# Patient Record
Sex: MALE | Race: WHITE | NOT HISPANIC OR LATINO | URBAN - METROPOLITAN AREA
[De-identification: names, ages, dates, MRNs, and addresses within clinical notes are randomized per-mention and may not be internally consistent; named-entity substitution may affect disease eponyms.]

---

## 2023-06-26 ENCOUNTER — INPATIENT (INPATIENT)
Facility: HOSPITAL | Age: 68
LOS: 0 days | Discharge: LEFT BEFORE DISCHARGE | DRG: 156 | End: 2023-06-27
Attending: STUDENT IN AN ORGANIZED HEALTH CARE EDUCATION/TRAINING PROGRAM | Admitting: STUDENT IN AN ORGANIZED HEALTH CARE EDUCATION/TRAINING PROGRAM
Payer: COMMERCIAL

## 2023-06-26 VITALS
DIASTOLIC BLOOD PRESSURE: 98 MMHG | HEART RATE: 87 BPM | TEMPERATURE: 98 F | WEIGHT: 196.21 LBS | RESPIRATION RATE: 16 BRPM | OXYGEN SATURATION: 96 % | SYSTOLIC BLOOD PRESSURE: 175 MMHG

## 2023-06-26 LAB
ALBUMIN SERPL ELPH-MCNC: 4 G/DL — SIGNIFICANT CHANGE UP (ref 3.4–5)
ALP SERPL-CCNC: 63 U/L — SIGNIFICANT CHANGE UP (ref 40–120)
ALT FLD-CCNC: 20 U/L — SIGNIFICANT CHANGE UP (ref 12–42)
ANION GAP SERPL CALC-SCNC: 12 MMOL/L — SIGNIFICANT CHANGE UP (ref 9–16)
APTT BLD: 30.3 SEC — SIGNIFICANT CHANGE UP (ref 27.5–35.5)
AST SERPL-CCNC: 25 U/L — SIGNIFICANT CHANGE UP (ref 15–37)
BASOPHILS # BLD AUTO: 0.03 K/UL — SIGNIFICANT CHANGE UP (ref 0–0.2)
BASOPHILS NFR BLD AUTO: 0.4 % — SIGNIFICANT CHANGE UP (ref 0–2)
BILIRUB SERPL-MCNC: 0.3 MG/DL — SIGNIFICANT CHANGE UP (ref 0.2–1.2)
BUN SERPL-MCNC: 22 MG/DL — SIGNIFICANT CHANGE UP (ref 7–23)
CALCIUM SERPL-MCNC: 9.3 MG/DL — SIGNIFICANT CHANGE UP (ref 8.5–10.5)
CHLORIDE SERPL-SCNC: 105 MMOL/L — SIGNIFICANT CHANGE UP (ref 96–108)
CO2 SERPL-SCNC: 22 MMOL/L — SIGNIFICANT CHANGE UP (ref 22–31)
CREAT SERPL-MCNC: 1.03 MG/DL — SIGNIFICANT CHANGE UP (ref 0.5–1.3)
EGFR: 80 ML/MIN/1.73M2 — SIGNIFICANT CHANGE UP
EOSINOPHIL # BLD AUTO: 0.13 K/UL — SIGNIFICANT CHANGE UP (ref 0–0.5)
EOSINOPHIL NFR BLD AUTO: 1.8 % — SIGNIFICANT CHANGE UP (ref 0–6)
GLUCOSE SERPL-MCNC: 115 MG/DL — HIGH (ref 70–99)
HCT VFR BLD CALC: 43.5 % — SIGNIFICANT CHANGE UP (ref 39–50)
HGB BLD-MCNC: 14.8 G/DL — SIGNIFICANT CHANGE UP (ref 13–17)
IMM GRANULOCYTES NFR BLD AUTO: 0.4 % — SIGNIFICANT CHANGE UP (ref 0–0.9)
INR BLD: 0.99 — SIGNIFICANT CHANGE UP (ref 0.88–1.16)
LYMPHOCYTES # BLD AUTO: 1.62 K/UL — SIGNIFICANT CHANGE UP (ref 1–3.3)
LYMPHOCYTES # BLD AUTO: 22.8 % — SIGNIFICANT CHANGE UP (ref 13–44)
MCHC RBC-ENTMCNC: 31 PG — SIGNIFICANT CHANGE UP (ref 27–34)
MCHC RBC-ENTMCNC: 34 GM/DL — SIGNIFICANT CHANGE UP (ref 32–36)
MCV RBC AUTO: 91 FL — SIGNIFICANT CHANGE UP (ref 80–100)
MONOCYTES # BLD AUTO: 0.66 K/UL — SIGNIFICANT CHANGE UP (ref 0–0.9)
MONOCYTES NFR BLD AUTO: 9.3 % — SIGNIFICANT CHANGE UP (ref 2–14)
NEUTROPHILS # BLD AUTO: 4.64 K/UL — SIGNIFICANT CHANGE UP (ref 1.8–7.4)
NEUTROPHILS NFR BLD AUTO: 65.3 % — SIGNIFICANT CHANGE UP (ref 43–77)
NRBC # BLD: 0 /100 WBCS — SIGNIFICANT CHANGE UP (ref 0–0)
PLATELET # BLD AUTO: 189 K/UL — SIGNIFICANT CHANGE UP (ref 150–400)
POTASSIUM SERPL-MCNC: 4.2 MMOL/L — SIGNIFICANT CHANGE UP (ref 3.5–5.3)
POTASSIUM SERPL-SCNC: 4.2 MMOL/L — SIGNIFICANT CHANGE UP (ref 3.5–5.3)
PROT SERPL-MCNC: 7.3 G/DL — SIGNIFICANT CHANGE UP (ref 6.4–8.2)
PROTHROM AB SERPL-ACNC: 11.6 SEC — SIGNIFICANT CHANGE UP (ref 10.5–13.4)
RBC # BLD: 4.78 M/UL — SIGNIFICANT CHANGE UP (ref 4.2–5.8)
RBC # FLD: 13.6 % — SIGNIFICANT CHANGE UP (ref 10.3–14.5)
SODIUM SERPL-SCNC: 139 MMOL/L — SIGNIFICANT CHANGE UP (ref 132–145)
TROPONIN I, HIGH SENSITIVITY RESULT: 5.2 NG/L — SIGNIFICANT CHANGE UP
WBC # BLD: 7.11 K/UL — SIGNIFICANT CHANGE UP (ref 3.8–10.5)
WBC # FLD AUTO: 7.11 K/UL — SIGNIFICANT CHANGE UP (ref 3.8–10.5)

## 2023-06-26 PROCEDURE — 99446 NTRPROF PH1/NTRNET/EHR 5-10: CPT

## 2023-06-26 PROCEDURE — 71045 X-RAY EXAM CHEST 1 VIEW: CPT | Mod: 26

## 2023-06-26 PROCEDURE — 70498 CT ANGIOGRAPHY NECK: CPT | Mod: 26

## 2023-06-26 PROCEDURE — 70496 CT ANGIOGRAPHY HEAD: CPT | Mod: 26

## 2023-06-26 PROCEDURE — 99291 CRITICAL CARE FIRST HOUR: CPT

## 2023-06-26 PROCEDURE — 0042T: CPT

## 2023-06-26 RX ORDER — MECLIZINE HCL 12.5 MG
25 TABLET ORAL ONCE
Refills: 0 | Status: COMPLETED | OUTPATIENT
Start: 2023-06-26 | End: 2023-06-26

## 2023-06-26 RX ADMIN — Medication 25 MILLIGRAM(S): at 19:43

## 2023-06-26 NOTE — ED ADULT NURSE NOTE - NSFALLUNIVINTERV_ED_ALL_ED
Bed/Stretcher in lowest position, wheels locked, appropriate side rails in place/Call bell, personal items and telephone in reach/Instruct patient to call for assistance before getting out of bed/chair/stretcher/Non-slip footwear applied when patient is off stretcher/[place] to call system/Physically safe environment - no spills, clutter or unnecessary equipment/Purposeful proactive rounding/Room/bathroom lighting operational, light cord in reach

## 2023-06-26 NOTE — ED PROVIDER NOTE - OBJECTIVE STATEMENT
Patient reports sudden onset at 4pm of gait imbalance, left tinnitus, and decreased hearing in left ear, like he is under water. No ear pain. 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 No fever, cp, sob, ap, n/v, focal weakness, paresthesias.

## 2023-06-26 NOTE — ED ADULT TRIAGE NOTE - CHIEF COMPLAINT QUOTE
Pt just arrived to [facility] 5 hours ago, c/o left ear echoing x 2 hrs, Pt is deaf in right ear. H/o TIA 9/2022. Denies any weakness, dizziness, unsteady gait,  facial paralysis.  Speech clear. Reports minor head that he took Tylenol for prior to arrival

## 2023-06-26 NOTE — ED PROVIDER NOTE - CLINICAL SUMMARY MEDICAL DECISION MAKING FREE TEXT BOX
Code stroke called due to unsteady gait. My initial impression was that this was a peripheral vertigo or menniere's but patient's description of constant dizziness and history of TIA, code stroke called.

## 2023-06-26 NOTE — ED ADULT NURSE REASSESSMENT NOTE - NS ED NURSE REASSESS COMMENT FT1
Received this pt from SEGUN[facility]arro. Pt comfortable, in nad.  Placed on CM  please see associated stroke code flowsheet

## 2023-06-26 NOTE — ED PROVIDER NOTE - PROGRESS NOTE DETAILS
Patient accepted to [facility] stroke unit by Dr. Solorzano. Spoke to telestroke Dr. Bella. She recommended admission for MRI. Agrees could be vestibular in origin, but given patient's risk factors and vague symptoms, it is unclear at this time.

## 2023-06-26 NOTE — ED ADULT NURSE NOTE - CHIEF COMPLAINT QUOTE
Pt just arrived to [facility] 5 hours ago, c/o left ear echoing x 2 hrs, Pt is deaf in right ear. H/o TIA 9/2022. Denies any weakness, dizziness, unsteady gait,  facial paralysis.  Speech clear. Reports minor head that he took Tylenol for prior to arrival

## 2023-06-26 NOTE — CHART NOTE - NSCHARTNOTEFT_GEN_A_CORE
Brief TeleStroke Note    69 yo man with h/o TIA, on AP, who presents today with hearing loss and tinnitus in L ear associated with dizziness which started at 4pm. NIHSS 0 per ED staff with no dysmetria and patient is able to walk.    CT negative for acute abnormalities, CTA with no LVO, CTP neg.    Although the patient is in the Tenecteplase window, he is not a candidate for Tenecteplase due to minor symptoms and NIHSS 0. Not a candidate for thrombectomy due to lack of LVO.    Hyun Bella  Neurocritical Care Attending Brief TeleStroke Note -Interprofessional Telephone discussion    69 yo man with h/o TIA, on AP, who presents today with hearing loss and tinnitus in L ear associated with dizziness which started at 4pm. NIHSS 0 per ED staff with no dysmetria and patient is able to walk.    CT negative for acute abnormalities, CTA with no LVO, CTP neg.    Although the patient is in the Tenecteplase window, he is not a candidate for Tenecteplase due to minor symptoms and NIHSS 0. Not a candidate for thrombectomy due to lack of LVO.      Review thrombolytic CONTRAINDICATIONS  [] None    ABSOLUTE EXCLUSION CRITERIA:  [] Patient outside of the appropriate time window for IV thrombolysis  [] Evidence of intracranial hemorrhage on pretreatment CT scan (CT must be read by an attending radiologist or attending neurologist)  [] Head CT findings suggesting an established acute cerebral infarction as evidenced by sigifredo hypodensity, regardless of size.  [] Clinical presentation consistent with subarachnoid hemorrhage, even with normal CT scan  [] Active internal bleeding  [] Known bleeding diathesis (including but not limited to platelet count < 100,000, use of oral anticoagulants with INR>1.7, use of full dose low molecular       weight heparin within the last 24 hours, use of unfractionated heparin AND a prolonged PTT (> 40sec), use of direct thrombin inhibitor (e.g. dabigatran) or oral direct Factor Xa inhibitor (e.g. rivaroxaban, apixaban) within 48 hours) with any degree of abnormality on any coagulation test; any DOAC taken within 3 hours of presentation, regardless of coagulation test results  [] Systolic pressure >= 185 mmHg or diastolic pressure 110 mmHg on repeated measurements at the time treatment is to begin  [] Care team unable to determine eligibility for IV thrombolysis  [] Patient, family, or surrogate declines and understands risks and benefits of treatment.  [] For wake-up Protocol patients: DW-MRI lesions larger than one-third of the MCA territory    RELATIVE EXCLUSION CRITERIA  [] Isolated neurological deficits (except for aphasia or hemianopsia)  [x] stroke severity too mild (non-disabling)  [] Seizure at onset with post-ictal residual neurological impairment  [] Gastrointestinal, genitourinary or respiratory hemorrhage within 21 days   [] Major surgery within 14 days   [] Blood glucose level < 50 or > 400 mG/dL  [] CPR with chest compressions or minor surgery (including liver and kidney biopsy, thoracocentesis, lumbar puncture) within 10 days   [] Arterial puncture at non-compressible site within 7 days   [] Evidence of acute trauma (fracture)   Significant head trauma or prior stroke in the previous 3 months  History of previous intracranial hemorrhage  Intracranial or intraspinal surgery within past 3 months[] Diabetic hemorrhagic retinopathy or ophthalmic bleeding  [] Pregnancy or peripartum; no nursing post- treatment  [] Post-myocardial infarction pericarditis  [] Peritoneal dialysis or hemodialysis or severe hepatic disease   [] Known bacterial endocarditis   [] Life expectancy less than 6 months or sever co-morbid illness   [] Known cerebral vascular malformation, untreated intracranial aneurysm or brain tumor (may consider IV alteplase in patients with CNS lesions that have a very low likelihood of hemorrhage, such as small un-ruptured aneurysms or benign tumors with low vascularity.  [] Social/Shinto reason  [] Other ____________________      Time spent during discussion (in minutes): 10 min      Hyun Bella  Neurocritical Care Attending

## 2023-06-27 VITALS — TEMPERATURE: 97 F

## 2023-06-27 LAB
A1C WITH ESTIMATED AVERAGE GLUCOSE RESULT: 5.4 % — SIGNIFICANT CHANGE UP (ref 4–5.6)
ANION GAP SERPL CALC-SCNC: 12 MMOL/L — SIGNIFICANT CHANGE UP (ref 5–17)
BUN SERPL-MCNC: 13 MG/DL — SIGNIFICANT CHANGE UP (ref 7–23)
CALCIUM SERPL-MCNC: 9.4 MG/DL — SIGNIFICANT CHANGE UP (ref 8.4–10.5)
CHLORIDE SERPL-SCNC: 108 MMOL/L — SIGNIFICANT CHANGE UP (ref 96–108)
CHOLEST SERPL-MCNC: 144 MG/DL — SIGNIFICANT CHANGE UP
CO2 SERPL-SCNC: 22 MMOL/L — SIGNIFICANT CHANGE UP (ref 22–31)
CREAT SERPL-MCNC: 0.81 MG/DL — SIGNIFICANT CHANGE UP (ref 0.5–1.3)
EGFR: 97 ML/MIN/1.73M2 — SIGNIFICANT CHANGE UP
ESTIMATED AVERAGE GLUCOSE: 108 MG/DL — SIGNIFICANT CHANGE UP (ref 68–114)
GLUCOSE SERPL-MCNC: 111 MG/DL — HIGH (ref 70–99)
HCT VFR BLD CALC: 47.4 % — SIGNIFICANT CHANGE UP (ref 39–50)
HCV AB S/CO SERPL IA: 0.04 S/CO — SIGNIFICANT CHANGE UP
HCV AB SERPL-IMP: SIGNIFICANT CHANGE UP
HDLC SERPL-MCNC: 44 MG/DL — SIGNIFICANT CHANGE UP
HGB BLD-MCNC: 16 G/DL — SIGNIFICANT CHANGE UP (ref 13–17)
LIPID PNL WITH DIRECT LDL SERPL: 82 MG/DL — SIGNIFICANT CHANGE UP
MAGNESIUM SERPL-MCNC: 2.1 MG/DL — SIGNIFICANT CHANGE UP (ref 1.6–2.6)
MCHC RBC-ENTMCNC: 30.2 PG — SIGNIFICANT CHANGE UP (ref 27–34)
MCHC RBC-ENTMCNC: 33.8 GM/DL — SIGNIFICANT CHANGE UP (ref 32–36)
MCV RBC AUTO: 89.6 FL — SIGNIFICANT CHANGE UP (ref 80–100)
NON HDL CHOLESTEROL: 100 MG/DL — SIGNIFICANT CHANGE UP
NRBC # BLD: 0 /100 WBCS — SIGNIFICANT CHANGE UP (ref 0–0)
PHOSPHATE SERPL-MCNC: 3 MG/DL — SIGNIFICANT CHANGE UP (ref 2.5–4.5)
PLATELET # BLD AUTO: 183 K/UL — SIGNIFICANT CHANGE UP (ref 150–400)
POTASSIUM SERPL-MCNC: 4 MMOL/L — SIGNIFICANT CHANGE UP (ref 3.5–5.3)
POTASSIUM SERPL-SCNC: 4 MMOL/L — SIGNIFICANT CHANGE UP (ref 3.5–5.3)
RBC # BLD: 5.29 M/UL — SIGNIFICANT CHANGE UP (ref 4.2–5.8)
RBC # FLD: 13.9 % — SIGNIFICANT CHANGE UP (ref 10.3–14.5)
SODIUM SERPL-SCNC: 142 MMOL/L — SIGNIFICANT CHANGE UP (ref 135–145)
TRIGL SERPL-MCNC: 89 MG/DL — SIGNIFICANT CHANGE UP
TSH SERPL-MCNC: 1.78 UIU/ML — SIGNIFICANT CHANGE UP (ref 0.27–4.2)
WBC # BLD: 5.57 K/UL — SIGNIFICANT CHANGE UP (ref 3.8–10.5)
WBC # FLD AUTO: 5.57 K/UL — SIGNIFICANT CHANGE UP (ref 3.8–10.5)

## 2023-06-27 PROCEDURE — 97165 OT EVAL LOW COMPLEX 30 MIN: CPT

## 2023-06-27 PROCEDURE — 99223 1ST HOSP IP/OBS HIGH 75: CPT

## 2023-06-27 PROCEDURE — 99291 CRITICAL CARE FIRST HOUR: CPT | Mod: 25

## 2023-06-27 PROCEDURE — 85025 COMPLETE CBC W/AUTO DIFF WBC: CPT

## 2023-06-27 PROCEDURE — 82962 GLUCOSE BLOOD TEST: CPT

## 2023-06-27 PROCEDURE — 70498 CT ANGIOGRAPHY NECK: CPT

## 2023-06-27 PROCEDURE — 84484 ASSAY OF TROPONIN QUANT: CPT

## 2023-06-27 PROCEDURE — 0042T: CPT

## 2023-06-27 PROCEDURE — 85610 PROTHROMBIN TIME: CPT

## 2023-06-27 PROCEDURE — 84100 ASSAY OF PHOSPHORUS: CPT

## 2023-06-27 PROCEDURE — 85730 THROMBOPLASTIN TIME PARTIAL: CPT

## 2023-06-27 PROCEDURE — 93005 ELECTROCARDIOGRAM TRACING: CPT

## 2023-06-27 PROCEDURE — 71045 X-RAY EXAM CHEST 1 VIEW: CPT

## 2023-06-27 PROCEDURE — 99254 IP/OBS CNSLTJ NEW/EST MOD 60: CPT

## 2023-06-27 PROCEDURE — 80061 LIPID PANEL: CPT

## 2023-06-27 PROCEDURE — 85027 COMPLETE CBC AUTOMATED: CPT

## 2023-06-27 PROCEDURE — 97161 PT EVAL LOW COMPLEX 20 MIN: CPT

## 2023-06-27 PROCEDURE — 80053 COMPREHEN METABOLIC PANEL: CPT

## 2023-06-27 PROCEDURE — 36415 COLL VENOUS BLD VENIPUNCTURE: CPT

## 2023-06-27 PROCEDURE — 83036 HEMOGLOBIN GLYCOSYLATED A1C: CPT

## 2023-06-27 PROCEDURE — 70496 CT ANGIOGRAPHY HEAD: CPT

## 2023-06-27 PROCEDURE — 84443 ASSAY THYROID STIM HORMONE: CPT

## 2023-06-27 PROCEDURE — 70450 CT HEAD/BRAIN W/O DYE: CPT

## 2023-06-27 PROCEDURE — 83735 ASSAY OF MAGNESIUM: CPT

## 2023-06-27 PROCEDURE — 80048 BASIC METABOLIC PNL TOTAL CA: CPT

## 2023-06-27 PROCEDURE — 86803 HEPATITIS C AB TEST: CPT

## 2023-06-27 RX ORDER — ENOXAPARIN SODIUM 100 MG/ML
40 INJECTION SUBCUTANEOUS EVERY 24 HOURS
Refills: 0 | Status: DISCONTINUED | OUTPATIENT
Start: 2023-06-27 | End: 2023-06-27

## 2023-06-27 RX ORDER — ASPIRIN/CALCIUM CARB/MAGNESIUM 324 MG
81 TABLET ORAL DAILY
Refills: 0 | Status: DISCONTINUED | OUTPATIENT
Start: 2023-06-27 | End: 2023-06-27

## 2023-06-27 RX ORDER — CLOPIDOGREL BISULFATE 75 MG/1
75 TABLET, FILM COATED ORAL DAILY
Refills: 0 | Status: DISCONTINUED | OUTPATIENT
Start: 2023-06-27 | End: 2023-06-27

## 2023-06-27 RX ORDER — ATORVASTATIN CALCIUM 80 MG/1
40 TABLET, FILM COATED ORAL AT BEDTIME
Refills: 0 | Status: DISCONTINUED | OUTPATIENT
Start: 2023-06-27 | End: 2023-06-27

## 2023-06-27 RX ADMIN — CLOPIDOGREL BISULFATE 75 MILLIGRAM(S): 75 TABLET, FILM COATED ORAL at 11:03

## 2023-06-27 RX ADMIN — ENOXAPARIN SODIUM 40 MILLIGRAM(S): 100 INJECTION SUBCUTANEOUS at 06:33

## 2023-06-27 NOTE — H&P ADULT - ASSESSMENT
68y Male with PMHx of TIA (presented as blurry vision, on Plavix 75mg daily), HTN, HLD, right ear deafness, initially presenting to [facility] with chief complaint of dizziness and left ear tinnitus as well as decreased hearing. Patient reports he is visiting from [place] for 1 month. On 6/26, he suddenly felt as though he was "hearing through a tin can" from his left ear and felt suddenly dizzy. He arrived to [facility] ED. NCHCT negative for hemorrhage or transcortical infarction. CTA head and neck negative for steno-occlusive disease. CT perfusion scan negative.     Neuro  #CVA workup  - continue plavix 75mg daily  - continue atorvastatin 80mg daily  - q4hr stroke neuro checks and vitals  - obtain MRI Brain without contrast with IAC protocol  - consult ENT in AM  - Stroke Code HCT Results: negative  - Stroke Code CTA Results: negative  - Stroke education    Cards  #HTN  - permissive hypertension, Goal -180  - hold home blood pressure medication for now  - obtain TTE with bubble  - Stroke Code EKG Results:    #HLD  - high dose statin as above in CVA  - LDL results: pending    Pulm  - call provider if SPO2 < 94%    GI  #Nutrition/Fluids/Electrolytes   - replete K<4 and Mg <2  - Diet: DASH    Renal  - daily BMP    Infectious Disease  - Stroke Code CXR results:     Endocrine    - A1C results: pending      - TSH results: pending    DVT Prophylaxis  - lovenox sq for DVT prophylaxis   - SCDs for DVT prophylaxis       Dispo: pending PT/OT eval     Discussed daily hospital plans and goals with patient.     Discussed with Neurology Attending Dr. Solorzano

## 2023-06-27 NOTE — CHART NOTE - NSCHARTNOTEFT_GEN_A_CORE
Patient seen during rounds with non-focal exam, NIHSS 0. He continues to have persistent decrease hearing in the left ear which has worsened since symptom onset 2 hours after getting of plane from [place]. Symptoms started as left ear pain with tinnitus and left ear hearing changes, described as "hearing thing under water", progressing to "hearing things that are getting farther and farther away." Low suspicion for a stroke that present with only hearing loss without other focal neurological symptoms, however plan to obtain MRI brain without contrast to rule out. Differential include labyrinthitis. ENT consulted; concern for sensoneuronal hearing loss. Prednisone 60mg daily x 10 days started.     Patient decided to leave AMA in the afternoon, declined MRI. Discussion involving risk of possible infarct that was not initially captured on CTH that would require additional w/u to fully assess stroke risk, risk of another possible stroke occurring in acute period that may lead to death. Patient and wife verbalized understanding. They rather follow up with ENT in office tomorrow as they feel symptoms more likely caused by ear etiology than brain. AMA form signed and in chart.    - Patient discharged with prednisone 60mg x 10 days per ENT. Patient already has f/u for next day audiology testing with  ENT  - stroke education provided. Patient to return to closest ED if stroke symptoms occur ([facility])  - patient can continue home plavix 75mg daily which will offer some degree secondary stroke prevention    Case discussed with Dr. Hannon

## 2023-06-27 NOTE — OCCUPATIONAL THERAPY INITIAL EVALUATION ADULT - DIAGNOSIS, OT EVAL
Pt presenting with L hearing impairment and mild dizziness, however performing all ADL/functional mobility independently at this time.

## 2023-06-27 NOTE — CONSULT NOTE ADULT - ASSESSMENT
-------------------------------  ASSESSMENT/PLAN:    IMPRESSION: ALEXUS TOURE  is a 68y Male with PMHx of TIA (presented as blurry vision, on Plavix 75mg daily), HTN, HLD, right ear deafness, presents with muffled hearing in the left ear following flight from the [facility] to [place] 6/27.     RECOMMENDATIONS:    ---  Thank you for the kind referral and for allowing me to share in the care of ALEXUS TOURE If you have any questions, please do not hesitate to contact me.     Sincerely,  Neris Mcintosh  06-27-23 @ 12:55       -------------------------------  ASSESSMENT/PLAN:    IMPRESSION: ALEXUS TOURE  is a 68y Male with PMHx of TIA (presented as blurry vision, on Plavix 75mg daily), HTN, HLD, right ear deafness, presents with muffled hearing in the left ear that started suddenly yesterday at 4pm. Stroke workup negative so far and CT scan yesterday without mastoid effusion bilaterally. Findings concerning for possible sudden onset sensorineural hearing loss without an identifiable cause.    RECOMMENDATIONS:  - Recommend starting 60mg prednisone qd today for a 10 day course  - Please follow up with Dr. Leticia Fink tomorrow at 9:30am ([phone], [address], [place], [place]) for hearing test    ---  Thank you for the kind referral and for allowing me to share in the care of ALEXUS TOURE If you have any questions, please do not hesitate to contact me.     Sincerely,  Neris Mcintosh  06-27-23 @ 12:55

## 2023-06-27 NOTE — OCCUPATIONAL THERAPY INITIAL EVALUATION ADULT - PERTINENT HX OF CURRENT PROBLEM, REHAB EVAL
68y Male with PMHx of TIA (presented as blurry vision, on Plavix 75mg daily), HTN, HLD, right ear deafness, initially presenting to [facility] with chief complaint of dizziness and left ear tinnitus as well as decreased hearing. Patient reports he is visiting from [place] for 1 month. On 6/26, he suddenly felt as though he was "hearing through a tin can" from his left ear and felt suddenly dizzy. He arrived to [facility] ED. NCHCT negative for hemorrhage or transcortical infarction. CTA head and neck negative for steno-occlusive disease. CT perfusion scan negative.

## 2023-06-27 NOTE — H&P ADULT - NSHPLABSRESULTS_GEN_ALL_CORE
Fingerstick Blood Glucose: CAPILLARY BLOOD GLUCOSE      POCT Blood Glucose.: 113 mg/dL (26 Jun 2023 18:36)    LABS:                        14.8   7.11  )-----------( 189      ( 26 Jun 2023 18:37 )             43.5     06-26    139  |  105  |  22  ----------------------------<  115<H>  4.2   |  22  |  1.03    Ca    9.3      26 Jun 2023 18:37    TPro  7.3  /  Alb  4.0  /  TBili  0.3  /  DBili  x   /  AST  25  /  ALT  20  /  AlkPhos  63  06-26    PT/INR - ( 26 Jun 2023 18:37 )   PT: 11.6 sec;   INR: 0.99          PTT - ( 26 Jun 2023 18:37 )  PTT:30.3 sec      Urinalysis Basic - ( 26 Jun 2023 18:37 )    Color: x / Appearance: x / SG: x / pH: x  Gluc: 115 mg/dL / Ketone: x  / Bili: x / Urobili: x   Blood: x / Protein: x / Nitrite: x   Leuk Esterase: x / RBC: x / WBC x   Sq Epi: x / Non Sq Epi: x / Bacteria: x        RADIOLOGY & ADDITIONAL STUDIES:    HCT:     CTA:    ----------------------------------------------------------------------------------------- Fingerstick Blood Glucose: CAPILLARY BLOOD GLUCOSE      POCT Blood Glucose.: 113 mg/dL (26 Jun 2023 18:36)    LABS:                        14.8   7.11  )-----------( 189      ( 26 Jun 2023 18:37 )             43.5     06-26    139  |  105  |  22  ----------------------------<  115<H>  4.2   |  22  |  1.03    Ca    9.3      26 Jun 2023 18:37    TPro  7.3  /  Alb  4.0  /  TBili  0.3  /  DBili  x   /  AST  25  /  ALT  20  /  AlkPhos  63  06-26    PT/INR - ( 26 Jun 2023 18:37 )   PT: 11.6 sec;   INR: 0.99          PTT - ( 26 Jun 2023 18:37 )  PTT:30.3 sec      Urinalysis Basic - ( 26 Jun 2023 18:37 )    Color: x / Appearance: x / SG: x / pH: x  Gluc: 115 mg/dL / Ketone: x  / Bili: x / Urobili: x   Blood: x / Protein: x / Nitrite: x   Leuk Esterase: x / RBC: x / WBC x   Sq Epi: x / Non Sq Epi: x / Bacteria: x        RADIOLOGY & ADDITIONAL STUDIES:    < from: CT Brain Stroke Protocol (06.26.23 @ 18:49) >    IMPRESSION: No intracranial hemorrhage or acute transcortical infarct.   Inflammatory sinus disease.    < end of copied text >    < from: CT Angio Neck Stroke Protocol w/ IV Cont (06.26.23 @ 19:09) >    IMPRESSION: Normal CTA of the brain.    < end of copied text >    < from: CT Angio Neck Stroke Protocol w/ IV Cont (06.26.23 @ 19:09) >    IMPRESSION: Normal CTA of the neck.    < end of copied text >    < from: CT Brain Perfusion Maps Stroke (06.26.23 @ 19:07) >    IMPRESSION: Normal CT perfusion study.    < end of copied text >          -----------------------------------------------------------------------------------------

## 2023-06-27 NOTE — CONSULT NOTE ADULT - SUBJECTIVE AND OBJECTIVE BOX
OTOLARYNGOLOGY (ENT) CONSULTATION NOTE    PATIENT: ALEXUS TOURE     MRN: [id number]       : 59  DATE OF ADMISSION:23  DATE OF SERVICE:  23 @ 12:55    CHIEF COMPLAINT: hearing loss    HISTORY OF PRESENT ILLNESS:  ALEXUS TOURE  is a 68y Male who presents with complaints of left sided muffled hearing that started yesterday 2 hours after a flight from [place]. He reports that he had a cold that started last week, including running nose and cough. He reports yesterday he flew from [place] to [place] for a month long planned trip with his wife. He didn't notice any changes on the plan, however, 2 hours after getting off of the plane, he began to feel as though everyone were speaking under water. He reports a 20 year history of hearing loss of the right ear, which he states is significant and started after a viral infection affected his ear after he had the flu. He intermittently uses a hearing aid on the left ear. He denies dizziness, vertigo, vision changes, headache, postural instability. He reports a long history of tinnitus that started when he lost his hearing in his left ear. He presented to [facility] yesterday, [place] CTH was negative there for hemorrhage or transcortical infarction. CTA head and neck negative and CT perfusion was also negative. He has a history of TIA (blurry vision), HTN, and HLD. He reports TIA resolved within 20 minutes on its own. While here, he has received one dose of aspirin and meclizine. He has been continued on home plavix and started on HSQ and atorvastatin.             PAST MEDICAL HISTORY:  Transient ischemic attack (TIA)    Hypertension    High cholesterol        CURRENT MEDICATIONS   atorvastatin 40 Oral  clopidogrel Tablet 75 Oral  enoxaparin Injectable 40 SubCutaneous      HOME MEDICATIONS:      ALLERGIES:  No Known Allergies    SOCIAL HISTORY: Pertinent included in HPI   FAMILY HISTORY      SURGICAL HISTORY:      REVIEW OF SYSTEMS: The patient was asked and responded to a review of systems regarding the following symptoms: constitutional, eye, ears, nose, mouth, throat, cardiovascular, respiratory. Pertinent factors have been included in the HPI.       PHYSICAL EXAM:  ENT EXAM-   Constitutional: Well-developed, well-nourished. Resting comfortably in his hospital bed. No hoarseness.     Head:  normocephalic, atraumatic.   Left ear: Pinna WNL. No mastoid erythema or tenderness. External auditory canal with minimal wax. Tympanic membrane intact and with moderate thickening, not retracted.   Right ear: Pinna WNL. external auditory canal with minimal wax, tympanic membrane intact and with moderate thickening, not retracted.   Weldon non-localizing, Rinne on L ear with AC>BC  Nose:  Septum intact, midline.  Inferior turbinates normal bilateral  OC/OP:  Tonsils present. Floor of mouth, buccal mucosa, lips, hard palate, soft palate, uvula, posterior pharyngeal wall normal.  Mucosa moist.  Neck:  Trachea midline.  Thyroid, parotid and submandibular glands normal.  Lymph:  No cervical adenopathy.  Facial Plastics:   MULTISYSTEM EXAM-  Neuro/Psych:  A&O x 3.  Mood stable.  Affect bright.  Cranial nerves: 2-12 grossly intact bilaterally.  Eyes:  EOMI, no nystagmus.  Pulm:  No dyspnea, non-labored breathing  Cardiovascular: Carotid pulses 2+ bilaterally.  No peripheral edema.  Skin:  No rash or lesions on exposed skin of head/neck      Vital Signs:  T(C): 36.9 (23 @ 10:53), Max: 36.9 (23 @ 10:53)  HR: 90 (23 @ 12:05) (71 - 118)  BP: 164/92 (23 @ 12:05) (147/82 - 175/98)  RR: 18 (23 @ 12:05) (16 - 19)  SpO2: 97% (23 @ 12:05) (95% - 98%)                        16.0   5.57  )-----------( 183      ( 2023 05:30 )             47.4        142  |  108  |  13  ----------------------------<  111<H>  4.0   |  22  |  0.81    Ca    9.4      2023 05:30  Phos  3.0       Mg     2.1         TPro  7.3  /  Alb  4.0  /  TBili  0.3  /  DBili  x   /  AST  25  /  ALT  20  /  AlkPhos  63     PT/INR - ( 2023 18:37 )   PT: 11.6 sec;   INR: 0.99          PTT - ( 2023 18:37 )  PTT:30.3 [id number]    MICROBIOLOGY:      PATHOLOGY:                 OTOLARYNGOLOGY (ENT) CONSULTATION NOTE    PATIENT: ALEXUS TOURE     MRN: [id number]       : 59  DATE OF ADMISSION:23  DATE OF SERVICE:  23 @ 12:55    CHIEF COMPLAINT: hearing loss    HISTORY OF PRESENT ILLNESS:  ALEXUS TOURE  is a 68y Male who presents with complaints of left sided muffled hearing that started yesterday 2 hours after a flight from [place]. He reports that he had a cold that started last week, including running nose and cough. He reports yesterday he flew from [place] to [place] for a month long planned trip with his wife. He didn't notice any changes on the plan, however, 2 hours after getting off of the plane, he began to feel as though everyone were speaking under water. He reports a 20 year history of hearing loss of the right ear, which he states is significant and started after a viral infection affected his ear after he had the flu. He intermittently uses a hearing aid on the left ear. He denies dizziness, vertigo, vision changes, headache, postural instability. He reports a long history of tinnitus that started when he lost his hearing in his left ear. He presented to [facility] yesterday, [place] CTH was negative there for hemorrhage or transcortical infarction. CTA head and neck negative and CT perfusion was also negative. He has a history of TIA (blurry vision), HTN, and HLD. He reports TIA resolved within 20 minutes on its own. While here, he has received one dose of aspirin and meclizine. He has been continued on home plavix and started on HSQ and atorvastatin. He denies history of exposure to ototoxic drugs, trauma, history of autoimmune disease.             PAST MEDICAL HISTORY:  Transient ischemic attack (TIA)    Hypertension    High cholesterol        CURRENT MEDICATIONS   atorvastatin 40 Oral  clopidogrel Tablet 75 Oral  enoxaparin Injectable 40 SubCutaneous      HOME MEDICATIONS:      ALLERGIES:  No Known Allergies    SOCIAL HISTORY: Pertinent included in HPI   FAMILY HISTORY      SURGICAL HISTORY:      REVIEW OF SYSTEMS: The patient was asked and responded to a review of systems regarding the following symptoms: constitutional, eye, ears, nose, mouth, throat, cardiovascular, respiratory. Pertinent factors have been included in the HPI.       PHYSICAL EXAM:  ENT EXAM-   Constitutional: Well-developed, well-nourished. Resting comfortably in his hospital bed. No hoarseness.     Head:  normocephalic, atraumatic.   Left ear: Pinna WNL. No mastoid erythema or tenderness. External auditory canal with minimal wax. Tympanic membrane intact and with moderate thickening, without effusion and not retracted.   Right ear: Pinna WNL. external auditory canal with minimal wax, tympanic membrane intact and with moderate thickening, without effusion and not retracted.   Weldon non-localizing, Rinne on L ear with AC>BC  Nose:  Septum intact, midline.  Inferior turbinates normal bilateral  OC/OP:  Tonsils present. Floor of mouth, buccal mucosa, lips, hard palate, soft palate, uvula, posterior pharyngeal wall normal.  Mucosa moist.  Neck:  Trachea midline.  Thyroid, parotid and submandibular glands normal.  Lymph:  No cervical adenopathy.  Facial Plastics:   MULTISYSTEM EXAM-  Neuro/Psych:  A&O x 3.  Mood stable.  Affect bright.  Cranial nerves: 2-12 grossly intact bilaterally.  Eyes:  EOMI, no nystagmus.  Pulm:  No dyspnea, non-labored breathing  Cardiovascular: Carotid pulses 2+ bilaterally.  No peripheral edema.  Skin:  No rash or lesions on exposed skin of head/neck      Vital Signs:  T(C): 36.9 (23 @ 10:53), Max: 36.9 (23 @ 10:53)  HR: 90 (23 @ 12:05) (71 - 118)  BP: 164/92 (23 @ 12:05) (147/82 - 175/98)  RR: 18 (23 @ 12:05) (16 - 19)  SpO2: 97% (23 @ 12:05) (95% - 98%)                        16.0   5.57  )-----------( 183      ( 2023 05:30 )             47.4        142  |  108  |  13  ----------------------------<  111<H>  4.0   |  22  |  0.81    Ca    9.4      2023 05:30  Phos  3.0       Mg     2.1         TPro  7.3  /  Alb  4.0  /  TBili  0.3  /  DBili  x   /  AST  25  /  ALT  20  /  AlkPhos  63     PT/INR - ( 2023 18:37 )   PT: 11.6 sec;   INR: 0.99          PTT - ( 2023 18:37 )  PTT:30.3 [id number]    MICROBIOLOGY:      PATHOLOGY:

## 2023-06-27 NOTE — CONSULT NOTE ADULT - ASSESSMENT
Neurology    68 y o Male with PMHx of TIA (presented as blurry vision, on Plavix 75mg daily), HTN, HLD, right ear deafness, initially presenting to [facility] with chief complaint of dizziness and left ear tinnitus as well as decreased hearing. Patient reports he is visiting from [place] for 1 month. On 6/26, he suddenly felt as though he was "hearing through a tin can" from his left ear and felt suddenly dizzy. He arrived to [facility] ED. NCHCT negative for hemorrhage or transcortical infarction. CTA head and neck negative for steno-occlusive disease. CT perfusion scan negative.     Neuro  #CVA workup  - continue plavix 75mg daily  - continue atorvastatin 80mg daily  - q4hr stroke neuro checks and vitals  - obtain MRI Brain without contrast with IAC protocol  - consult ENT in AM  - Stroke Code HCT Results: negative  - Stroke Code CTA Results: negative  - Stroke education    Cards  #HTN  - permissive hypertension, Goal -180  - hold home blood pressure medication for now  - obtain TTE with bubble  - Stroke Code EKG Results:    #HLD  - high dose statin as above in CVA  - LDL results: pending    Pulm  - call provider if SPO2 < 94%    GI  #Nutrition/Fluids/Electrolytes   - replete K<4 and Mg <2  - Diet: DASH    Renal  - daily BMP    Infectious Disease  - Stroke Code CXR results:     Endocrine    - A1C results: pending      - TSH results: pending    DVT Prophylaxis  - lovenox sq for DVT prophylaxis   - SCDs for DVT prophylaxis       Dispo: pending PM&R / PT/OT eval

## 2023-06-27 NOTE — PHYSICAL THERAPY INITIAL EVALUATION ADULT - PERTINENT HX OF CURRENT PROBLEM, REHAB EVAL
HPI: 68y Male with PMHx of TIA (presented as blurry vision, on Plavix 75mg daily), HTN, HLD, right ear deafness, initially presenting to [facility] with chief complaint of dizziness and left ear tinnitus as well as decreased hearing. Patient reports he is visiting from [place] for 1 month. On 6/26, he suddenly felt as though he was "hearing through a tin can" from his left ear and felt suddenly dizzy. He arrived to [facility] ED. NCHCT negative for hemorrhage or transcortical infarction. CTA head and neck negative for steno-occlusive disease. CT perfusion scan negative.

## 2023-06-27 NOTE — PATIENT PROFILE ADULT - FALL HARM RISK - HARM RISK INTERVENTIONS
Assistance with ambulation/Assistance OOB with selected safe patient handling equipment/Communicate Risk of Fall with Harm to all staff/Discuss with provider need for PT consult/Monitor gait and stability/Provide patient with walking aids - walker, cane, crutches/Reinforce activity limits and safety measures with patient and family/Sit up slowly, dangle for a short time, stand at bedside before walking/Tailored Fall Risk Interventions/Visual Cue: Yellow wristband and red socks/Bed in lowest position, wheels locked, appropriate side rails in place/Call bell, personal items and telephone in reach/Instruct patient to call for assistance before getting out of bed or chair/Non-slip footwear when patient is out of bed/[place] to call system/Physically safe environment - no spills, clutter or unnecessary equipment/Purposeful Proactive Rounding/Room/bathroom lighting operational, light cord in reach

## 2023-06-27 NOTE — CONSULT NOTE ADULT - SUBJECTIVE AND OBJECTIVE BOX
Patient is a 68y old  Male who presents with a chief complaint of dizziness (27 Jun 2023 02:03)      HPI:   **STROKE HPI***    HPI: 68y Male with PMHx of TIA (presented as blurry vision, on Plavix 75mg daily), HTN, HLD, right ear deafness, initially presenting to [facility] with chief complaint of dizziness and left ear tinnitus as well as decreased hearing. Patient reports he is visiting from [place] for 1 month. On 6/26, he suddenly felt as though he was "hearing through a tin can" from his left ear and felt suddenly dizzy. He arrived to [facility] ED. NCHCT negative for hemorrhage or transcortical infarction. CTA head and neck negative for steno-occlusive disease. CT perfusion scan negative.     PAST MEDICAL & SURGICAL HISTORY:  Transient ischemic attack (TIA)      Hypertension      High cholesterol          FAMILY HISTORY:      T(C): 36.6 (06-27-23 @ 00:59), Max: 36.7 (06-26-23 @ 17:44)  HR: 92 (06-27-23 @ 00:59) (71 - 92)  BP: 154/90 (06-27-23 @ 00:59) (147/82 - 175/98)  RR: 17 (06-27-23 @ 00:59) (16 - 19)  SpO2: 98% (06-27-23 @ 00:59) (95% - 98%)    MEDICATION RECONCILIATION   MEDICATIONS  (STANDING):  aspirin enteric coated 81 milliGRAM(s) Oral daily  atorvastatin 40 milliGRAM(s) Oral at bedtime  enoxaparin Injectable 40 milliGRAM(s) SubCutaneous every 24 hours    MEDICATIONS  (PRN):    Allergies    No Known Allergies    Intolerances      Vital Signs Last 24 Hrs  T(C): 36.6 (27 Jun 2023 00:59), Max: 36.7 (26 Jun 2023 17:44)  T(F): 97.8 (27 Jun 2023 00:59), Max: 98 (26 Jun 2023 17:44)  HR: 92 (27 Jun 2023 00:59) (71 - 92)  BP: 154/90 (27 Jun 2023 00:59) (147/82 - 175/98)  BP(mean): --  RR: 17 (27 Jun 2023 00:59) (16 - 19)  SpO2: 98% (27 Jun 2023 00:59) (95% - 98%)    Parameters below as of 27 Jun 2023 00:59  Patient On (Oxygen Delivery Method): room air   (27 Jun 2023 02:03)    PAST MEDICAL & SURGICAL HISTORY:  Transient ischemic attack (TIA)      Hypertension      High cholesterol        MEDICATIONS  (STANDING):  atorvastatin 40 milliGRAM(s) Oral at bedtime  clopidogrel Tablet 75 milliGRAM(s) Oral daily  enoxaparin Injectable 40 milliGRAM(s) SubCutaneous every 24 hours    MEDICATIONS  (PRN):          Social History:   lives in [place]     Baseline Functional Level Prior to Admission :             - ADL's/ IADL's :  independent           - Ambulatory status prior to admission :   walked with no DME         FAMILY HISTORY:      CBC Full  -  ( 27 Jun 2023 05:30 )  WBC Count : 5.57 K/uL  RBC Count : 5.29 M/uL  Hemoglobin : 16.0 g/dL  Hematocrit : 47.4 %  Platelet Count - Automated : 183 K/uL  Mean Cell Volume : 89.6 fl  Mean Cell Hemoglobin : 30.2 pg  Mean Cell Hemoglobin Concentration : 33.8 gm/dL  Auto Neutrophil # : x  Auto Lymphocyte # : x  Auto Monocyte # : x  Auto Eosinophil # : x  Auto Basophil # : x  Auto Neutrophil % : x  Auto Lymphocyte % : x  Auto Monocyte % : x  Auto Eosinophil % : x  Auto Basophil % : x      06-27    142  |  108  |  x   ----------------------------<  111<H>  4.0   |  22  |  0.81    Ca    9.4      27 Jun 2023 05:30  Phos  3.0     06-27  Mg     2.1     06-27    TPro  7.3  /  Alb  4.0  /  TBili  0.3  /  DBili  x   /  AST  25  /  ALT  20  /  AlkPhos  63  06-26      Urinalysis Basic - ( 27 Jun 2023 05:30 )    Color: x / Appearance: x / SG: x / pH: x  Gluc: 111 mg/dL / Ketone: x  / Bili: x / Urobili: x   Blood: x / Protein: x / Nitrite: x   Leuk Esterase: x / RBC: x / WBC x   Sq Epi: x / Non Sq Epi: x / Bacteria: x        Radiology :     < from: CT Brain Stroke Protocol (06.26.23 @ 18:49) >  ACC: [id number] EXAM:  CT BRAIN STROKE PROTOCOL   ORDERED BY: TASHA DELACRUZ     PROCEDURE DATE:  06/26/2023          INTERPRETATION:  PROCEDURE: CT head without intravenous contrast    INDICATION: Gait imbalance    TECHNIQUE: Multiple axial imageswere obtained at 5 mm intervals from the   skull base to the vertex. Sagittal and coronal reformatted images were   obtained from the axial data set. The images were reviewed in brain and   bone windows. RAPID AI was used for preliminary interpretation for   hemorrhage detection.    COMPARISON: None    FINDINGS: The CT examination demonstrates the ventricles, cisternal   spaces, and cortical sulci to be within normal limits. There is no   midline shift or extra axial collections. The gray white differentiation   appears within normal limits. There is no intracranial hemorrhage or   acute transcortical infarct.    The bony windows demonstrates no fractures. There is mucosal thickening   with moderate-sized fluid levels within the maxillary sinuses   bilaterally. There is soft tissue opacification of the ethmoid sinus   bilaterally as well as mucosal thickening with foamy debris within the   sphenoid sinus. The mastoid air cells are well aerated.    The study was performed at 6:41 pm and the above findings were discussed   with Dr. Delacruz at 6:48 pm.    IMPRESSION: No intracranial hemorrhage or acute transcortical infarct.   Inflammatory sinus disease.    < from: CT Brain Perfusion Maps Stroke (06.26.23 @ 19:07) >  ACC: [id number] EXAM:  CT BRAIN PERFUSION MAPS STROKE   ORDERED BY: TASHA DELACRUZ     PROCEDURE DATE:  06/26/2023          INTERPRETATION:  PROCEDURE: CT Perfusion with intravenous contrast.    INDICATION: Gait imbalance    TECHNIQUE: Following the intravenous administration of 45 ml of Omnipaque   350, serial axial images were obtained through the brain. The CT   perfusion data set was post processed per [facility] protocol   generating color maps of CBF, CBV, MTT, and Tmax.    COMPARISON: None    FINDINGS: The CT perfusion study demonstrates no perfusion abnormality.   There is no mismatch volume.    IMPRESSION: Normal CT perfusion study.      < from: CT Angio Neck Stroke Protocol w/ IV Cont (06.26.23 @ 19:09) >  ACC: [id number] EXAM:  CT ANGIO BRAIN STROKE PROTC IC   ORDERED BY: TSAHA DELACRUZ     ACC: [id number] EXAM:  CT ANGIO NECK STROKE PROTCL IC   ORDERED BY: TASHA DELACRUZ     PROCEDURE DATE:  06/26/2023          INTERPRETATION:  PROCEDURE: CTA brainwith and without intravenous   contrast.    INDICATION: Gait imbalance    TECHNIQUE: Multiple thin section axial images were obtained through the   [place] following the intravenous injection of 80 ml of   Omnipaque 350. Multiple 3-D reformatted images were generated from the   axial cuts.    COMPARISON: None    FINDINGS: The CTA examination demonstrates the internal carotid arteries   to be normal in caliber. There is a normal bifurcation into the A1 and M1   segments. There is a normalMCA bifurcation. The vertebral arteries are   normal in caliber. The basilar artery is normal in caliber. There is a   normal bifurcation into the posterior cerebral arteries. There are no   areas of stenosis, dilatation or aneurysm.    IMPRESSION: Normal CTA of the brain.      PROCEDURE: CTA neck with and without intravenous contrast.    INDICATION: Gait imbalance    TECHNIQUE: Multiple axial thin section were obtained through the neck   following the intravenous injection of 80 ml of Omnipaque 350. Multiple   3-D reformatted images were generated from the axial cuts.    COMPARISON: None    FINDINGS: The CTA examination demonstrates the right common carotid   artery to be normal in caliber. There is a normal bifurcation into the   right internal and external carotid arteries. There is no hemodynamically   significant stenosis. There is a looped configuration to the midportion   of the right internal carotid artery.    The left common carotid artery is normal in caliber. There is a normal  bifurcation into the left internal and external carotid arteries. There   is calcification at the left carotid bifurcation and proximal left   internal carotid artery. There is no hemodynamically significant stenosis.    The right vertebral artery is underdeveloped. The vertebral arteries are   otherwise normal in caliber.    The aortic arch appears intact without narrowing of the origin of the   great vessels.    IMPRESSION: Normal CTA of the neck.         REVIEW OF SYSTEMS:  dizziness         Vital Signs Last 24 Hrs  T(C): 36.3 (27 Jun 2023 06:54), Max: 36.7 (26 Jun 2023 17:44)  T(F): 97.4 (27 Jun 2023 06:54), Max: 98 (26 Jun 2023 17:44)  HR: 118 (27 Jun 2023 04:18) (71 - 118)  BP: 159/76 (27 Jun 2023 04:18) (147/82 - 175/98)  BP(mean): 109 (27 Jun 2023 04:18) (109 - 118)  RR: 19 (27 Jun 2023 04:18) (16 - 19)  SpO2: 97% (27 Jun 2023 04:18) (95% - 98%)    Parameters below as of 27 Jun 2023 04:18  Patient On (Oxygen Delivery Method): room air            Physical Exam :  68 y o man lying comfortably in semi Dixon's position , awake , alert , no acute complaints      Head : normocephalic , atraumatic    Eyes : PERRLA , EOMI , no nystagmus , sclera anicteric    ENT / FACE : neg nasal discharge , uvula midline , no oropharyngeal erythema / exudate    Neck : supple , negative JVD , negative carotid bruits , no thyromegaly    Chest : CTA bilaterally , neg wheeze / rhonchi / rales / crackles / egophany    Cardiovascular : regular rate and rhythm , neg murmurs / rubs / gallops    Abdomen : soft , non distended , non tender to palpation in all 4 quadrants ,  normal bowel sounds     Extremities : WWP , neg cyanosis /clubbing / edema     Neurologic Exam :    Alert and oriented to person , place , date/year , speech fluent w/o dysarthria , follows commands , recent and remote memory intact , repetition intact , comprehension intact ,  attention/concentration intact , fund of knowledge appropriate    Cranial Nerves:     II :                         pupils equal , round and reactive to light , visual fields intact   III/ IV/VI :              extraocular movements intact , neg nystagmus , neg ptosis  V :                        facial sensation intact , V1-3 normal  VII :                      face symmetric , no droop , normal eye closure and smile  VIII :                     hearing intact to finger rub bilaterally  IX and X :             no hoarseness , gag intact , palate/ uvula rise symmetrically  XI :                       SCM / trapezius strength intact bilateral  XII :                      no tongue deviation    Motor Exam:          Right UE:               no focal weakness ,  > 4/5 throughout  , no drift     Left UE:                 no focal weakness ,  > 4/5 throughout  , no drift         Right LE:    no focal weakness ,  > 4/5 throughout 4      Left LE:    no focal weakness ,  > 4/5 throughout  4      Sensation :         intact to light touch x 4 extremities                            no neglect or extinction on double simultaneous testing      DTR :     biceps/brachioradialis : equal                      patella/ankle : equal     neg Babinski       Coordination :      Finger to Nose :  neg dysmetria bilaterally       Gait :  not tested          PM&R Impression :     admitted for c/o dizziness     - no acute pathology on CT brain imaging , MRI pending     - no focal neurologic deficits         Recommendations / Plan :              1) Physical / Occupational therapy focusing on therapeutic exercises , equipment evaluation , bed mobility/transfer out of bed evaluation , progressive ambulation with assistive devices prn .    2) Current disposition plan recommendation  :    pending functional progress

## 2023-06-27 NOTE — H&P ADULT - HISTORY OF PRESENT ILLNESS
**STROKE HPI***    HPI: 68y Male with PMHx of TIA, HTN, HLD, initially presenting to [facility] with chief complaint of dizziness and left ear tinnitus as well as decreased hearing.     PAST MEDICAL & SURGICAL HISTORY:  Transient ischemic attack (TIA)      Hypertension      High cholesterol          FAMILY HISTORY:      T(C): 36.6 (06-27-23 @ 00:59), Max: 36.7 (06-26-23 @ 17:44)  HR: 92 (06-27-23 @ 00:59) (71 - 92)  BP: 154/90 (06-27-23 @ 00:59) (147/82 - 175/98)  RR: 17 (06-27-23 @ 00:59) (16 - 19)  SpO2: 98% (06-27-23 @ 00:59) (95% - 98%)    MEDICATION RECONCILIATION   MEDICATIONS  (STANDING):  aspirin enteric coated 81 milliGRAM(s) Oral daily  atorvastatin 40 milliGRAM(s) Oral at bedtime  enoxaparin Injectable 40 milliGRAM(s) SubCutaneous every 24 hours    MEDICATIONS  (PRN):    Allergies    No Known Allergies    Intolerances      Vital Signs Last 24 Hrs  T(C): 36.6 (27 Jun 2023 00:59), Max: 36.7 (26 Jun 2023 17:44)  T(F): 97.8 (27 Jun 2023 00:59), Max: 98 (26 Jun 2023 17:44)  HR: 92 (27 Jun 2023 00:59) (71 - 92)  BP: 154/90 (27 Jun 2023 00:59) (147/82 - 175/98)  BP(mean): --  RR: 17 (27 Jun 2023 00:59) (16 - 19)  SpO2: 98% (27 Jun 2023 00:59) (95% - 98%)    Parameters below as of 27 Jun 2023 00:59  Patient On (Oxygen Delivery Method): room air    **STROKE HPI***    HPI: 68y Male with PMHx of TIA (presented as blurry vision, on Plavix 75mg daily), HTN, HLD, right ear deafness, initially presenting to [facility] with chief complaint of dizziness and left ear tinnitus as well as decreased hearing. Patient reports he is visiting from [place] for 1 month. On 6/26, he suddenly felt as though he was "hearing through a tin can" from his left ear and felt suddenly dizzy. He arrived to [facility] ED. NCHCT negative for hemorrhage or transcortical infarction. CTA head and neck negative for steno-occlusive disease. CT perfusion scan negative.     PAST MEDICAL & SURGICAL HISTORY:  Transient ischemic attack (TIA)      Hypertension      High cholesterol          FAMILY HISTORY:      T(C): 36.6 (06-27-23 @ 00:59), Max: 36.7 (06-26-23 @ 17:44)  HR: 92 (06-27-23 @ 00:59) (71 - 92)  BP: 154/90 (06-27-23 @ 00:59) (147/82 - 175/98)  RR: 17 (06-27-23 @ 00:59) (16 - 19)  SpO2: 98% (06-27-23 @ 00:59) (95% - 98%)    MEDICATION RECONCILIATION   MEDICATIONS  (STANDING):  aspirin enteric coated 81 milliGRAM(s) Oral daily  atorvastatin 40 milliGRAM(s) Oral at bedtime  enoxaparin Injectable 40 milliGRAM(s) SubCutaneous every 24 hours    MEDICATIONS  (PRN):    Allergies    No Known Allergies    Intolerances      Vital Signs Last 24 Hrs  T(C): 36.6 (27 Jun 2023 00:59), Max: 36.7 (26 Jun 2023 17:44)  T(F): 97.8 (27 Jun 2023 00:59), Max: 98 (26 Jun 2023 17:44)  HR: 92 (27 Jun 2023 00:59) (71 - 92)  BP: 154/90 (27 Jun 2023 00:59) (147/82 - 175/98)  BP(mean): --  RR: 17 (27 Jun 2023 00:59) (16 - 19)  SpO2: 98% (27 Jun 2023 00:59) (95% - 98%)    Parameters below as of 27 Jun 2023 00:59  Patient On (Oxygen Delivery Method): room air

## 2023-06-27 NOTE — CONSULT NOTE ADULT - SUBJECTIVE AND OBJECTIVE BOX
See below for stroke HPI  "68y Male with PMHx of TIA (presented as blurry vision, on Plavix 75mg daily), HTN, HLD, right ear deafness, initially presenting to [facility] with chief complaint of dizziness and left ear tinnitus as well as decreased hearing. Patient reports he is visiting from [place] for 1 month. On 6/26, he suddenly felt as though he was "hearing through a tin can" from his left ear and felt suddenly dizzy. He arrived to [facility] ED. NCHCT negative for hemorrhage or transcortical infarction. CTA head and neck negative for steno-occlusive disease. CT perfusion scan negative."      Remaining ROS negative       PHYSICAL EXAM:    General: NAD, sitting up in bed  HEENT: NC/AT;  MMM  Neck: supple  Cardiovascular: +S1/S2, RRR, no mrg  Respiratory: CTA B/L; no W/R/R  Gastrointestinal: soft, NT/ND; +BSx4  Extremities: WWP; no edema  Psychiatric: pleasant mood and affect  Dermatologic: no appreciable wounds or damage to the skin    VITAL SIGNS:  Vital Signs Last 24 Hrs  T(C): 35.9 (27 Jun 2023 14:31), Max: 36.9 (27 Jun 2023 10:53)  T(F): 96.7 (27 Jun 2023 14:31), Max: 98.5 (27 Jun 2023 10:53)  HR: 86 (27 Jun 2023 13:25) (74 - 102)  BP: 162/97 (27 Jun 2023 13:25) (150/91 - 164/92)  BP(mean): 122 (27 Jun 2023 13:25) (112 - 122)  RR: 18 (27 Jun 2023 12:05) (18 - 18)  SpO2: 97% (27 Jun 2023 13:25) (93% - 97%)    Parameters below as of 27 Jun 2023 12:05  Patient On (Oxygen Delivery Method): room air          MEDICATIONS:  MEDICATIONS  (STANDING):    MEDICATIONS  (PRN):      ALLERGIES:  Allergies    No Known Allergies    Intolerances        LABS:                        16.0   5.57  )-----------( 183      ( 27 Jun 2023 05:30 )             47.4     06-27    142  |  108  |  13  ----------------------------<  111<H>  4.0   |  22  |  0.81    Ca    9.4      27 Jun 2023 05:30  Phos  3.0     06-27  Mg     2.1     06-27    TPro  7.3  /  Alb  4.0  /  TBili  0.3  /  DBili  x   /  AST  25  /  ALT  20  /  AlkPhos  63  06-26    PT/INR - ( 26 Jun 2023 18:37 )   PT: 11.6 sec;   INR: 0.99          PTT - ( 26 Jun 2023 18:37 )  PTT:30.3 sec  Urinalysis Basic - ( 27 Jun 2023 05:30 )    Color: x / Appearance: x / SG: x / pH: x  Gluc: 111 mg/dL / Ketone: x  / Bili: x / Urobili: x   Blood: x / Protein: x / Nitrite: x   Leuk Esterase: x / RBC: x / WBC x   Sq Epi: x / Non Sq Epi: x / Bacteria: x      CAPILLARY BLOOD GLUCOSE      POCT Blood Glucose.: 113 mg/dL (26 Jun 2023 18:36)      RADIOLOGY & ADDITIONAL TESTS: Reviewed.

## 2023-06-27 NOTE — H&P ADULT - NSHPREVIEWOFSYSTEMS_GEN_ALL_CORE
ROS:  Constitutional: No fever, weight loss or fatigue  Eyes: No eye pain, visual disturbances, or discharge  ENMT:  No difficulty hearing, tinnitus, vertigo; No sinus or throat pain  Neck: No pain or stiffness  Respiratory: No cough, wheezing, chills or hemoptysis  Cardiovascular: No chest pain, palpitations, shortness of breath, dizziness or leg swelling  Gastrointestinal: No abdominal pain. No nausea, vomiting or hematemesis; No diarrhea or constipation. Nohematochezia.  Genitourinary: No dysuria, frequency, hematuria or incontinence  Neurological: As per HPI  Skin: No itching, burning, rashes or lesions   Endocrine: No heat or cold intolerance; No hair loss  Musculoskeletal: No joint pain or swelling; No muscle, back or extremity pain  Psychiatric: No depression, anxiety, mood swings or difficulty sleeping  Heme/Lymph: No easy bruising or bleeding gums ROS:  Constitutional: No fever, weight loss or fatigue  Eyes: No eye pain, visual disturbances, or discharge  ENMT:  deaf in right ear, decreased hearing in left ear  Neck: No pain or stiffness  Respiratory: No cough, wheezing, chills or hemoptysis  Cardiovascular: No chest pain, palpitations, shortness of breath, dizziness or leg swelling  Gastrointestinal: No abdominal pain. No nausea, vomiting or hematemesis; No diarrhea or constipation. Nohematochezia.  Genitourinary: No dysuria, frequency, hematuria or incontinence  Neurological: As per HPI  Skin: No itching, burning, rashes or lesions   Endocrine: No heat or cold intolerance; No hair loss  Musculoskeletal: No joint pain or swelling; No muscle, back or extremity pain  Psychiatric: No depression, anxiety, mood swings or difficulty sleeping  Heme/Lymph: No easy bruising or bleeding gums

## 2023-06-27 NOTE — PHYSICAL THERAPY INITIAL EVALUATION ADULT - PATIENT/FAMILY AGREES WITH PLAN
[General Appearance - Well Developed] : well developed [General Appearance - In No Acute Distress] : no acute distress [Normal Conjunctiva] : the conjunctiva exhibited no abnormalities [Eyelids - No Xanthelasma] : the eyelids demonstrated no xanthelasmas [Murmurs] : no murmurs present [Heart Rate And Rhythm] : heart rate and rhythm were normal [Heart Sounds] : normal S1 and S2 [Exaggerated Use Of Accessory Muscles For Inspiration] : no accessory muscle use [Respiration, Rhythm And Depth] : normal respiratory rhythm and effort [Auscultation Breath Sounds / Voice Sounds] : lungs were clear to auscultation bilaterally [Abdomen Tenderness] : non-tender [Abdomen Soft] : soft [Abnormal Walk] : normal gait [Abdomen Mass (___ Cm)] : no abdominal mass palpated [Nail Clubbing] : no clubbing of the fingernails [Gait - Sufficient For Exercise Testing] : the gait was sufficient for exercise testing [Cyanosis, Localized] : no localized cyanosis [Skin Color & Pigmentation] : normal skin color and pigmentation [] : no rash [No Skin Ulcers] : no skin ulcer [Oriented To Time, Place, And Person] : oriented to person, place, and time [FreeTextEntry1] : no JVD yes

## 2023-06-27 NOTE — PHYSICAL THERAPY INITIAL EVALUATION ADULT - MODALITIES TREATMENT COMMENTS
R hand dominant; (L) hand  %/5, (R) hand  5/5. CN Testing: B/L Frontalis intact; B/L buccinator intact; smile symmetrical; tongue protrusion at midline; B/L eyes open/close intact; Shoulder elevation: intact bilaterally; Vision H-Test: bilateral tracking and smooth pursuit intact; Convergence/Divergence: intact; Vision Quadrant Test: intact bilaterally, R sided hearing impaired ( baseline) negative...

## 2023-06-27 NOTE — H&P ADULT - NSHPSOCIALHISTORY_GEN_ALL_CORE
SOCIAL HISTORY:   Patient lives with *** at ***.   Smoking status:  Drinking:  Drug Use: SOCIAL HISTORY:   Patient lives in [place]

## 2023-06-27 NOTE — OCCUPATIONAL THERAPY INITIAL EVALUATION ADULT - ADDITIONAL COMMENTS
Prior to admission pt performing all ADLs independently and w/o use of AD/AE. Pt is right hand dominant. Pt with R ear deafness at baseline. Pt wears glasses at baseline 2/2 blurry vision.

## 2023-06-27 NOTE — OCCUPATIONAL THERAPY INITIAL EVALUATION ADULT - GENERAL OBSERVATIONS, REHAB EVAL
OT IE completed. MRS 1. Pt admitted to [facility] for stroke evaluation. Orders received, chart reviewed, pt cleared for OT by SEGUNDO Brunner. Pt received semi supine in bed, NAD, +heplock, +tele. Pt A&Ox4, agreeable to OT, and tolerated session well.

## 2023-06-27 NOTE — CONSULT NOTE ADULT - ASSESSMENT
68M with history of R ear deafness (after the flu), prior TIA presenting with dizziness, and admitted to the stroke service for further workup.     #Prior TIA  #CVA workup  #Dizziness  - plan per stroke team  - planning for echo and MRI  - ENT consult as well    DVT ppx, diet, PT consult per primary team    50 minutes spent on this encounter, including face to face with patient, care coordination and documentation.  68M with history of R ear deafness (after the flu), prior TIA presenting with dizziness, and admitted to the stroke service for further workup.     #Prior TIA  #CVA workup  #Dizziness  - plan per stroke team  - planning for echo and MRI  - ENT consult as well    DVT ppx, diet, PT consult per primary team    60 minutes spent on this encounter, including face to face with patient, care coordination and documentation.

## 2023-06-27 NOTE — PHYSICAL THERAPY INITIAL EVALUATION ADULT - ADDITIONAL COMMENTS
Pt is visiting [place] from the [place]. Pt reports living in a private home with FIORDALIZA. Pt denies using an AD for ambulation.

## 2023-06-27 NOTE — OCCUPATIONAL THERAPY INITIAL EVALUATION ADULT - MODALITIES TREATMENT COMMENTS
Cranial Nerves II - XII: II: PERRLA; visual fields are full to confrontation III, IV, VI: EOMI, no nystagmus appreciated V: facial sensation intact to light touch V1-V3 b/l VII: no ptosis, no facial droop, symmetric eyebrow raise and closure VIII: hearing intact to finger rub b/l  XI: head turning and shoulder shrug intact b/l XII: tongue protrusion midline // Pt able to ambulate independently and w/o use of AD. Pt able to ascend/descend 1 FOS independently and w/o use of AD. No deficits observed throughout.

## 2023-06-27 NOTE — H&P ADULT - NSHPPHYSICALEXAM_GEN_ALL_CORE
Physical exam:  General: No acute distress, awake and alert  Cardiovascular: Regular rate and rhythm, no murmurs, rubs, or gallops. No bruits  Pulmonary: Anterior breath sounds clear bilaterally, no crackles or wheezing. No use of accessory muscles  GI: Abdomen soft, non-tender, non-distended    Neurologic:  -Mental status: Awake, alert, oriented to person, place, and time. Speech is fluent with intact naming, repetition, and comprehension, no dysarthria. Recent and remote memory intact. Follows commands. Attention/concentration intact. Fund of knowledge appropriate.  -Cranial nerves:   II: Visual fields are full to confrontation.  III, IV, VI: Extraocular movements are intact without nystagmus. Pupils equally round and reactive to light  V:  Facial sensation V1-V3 equal and intact   VII: Face is symmetric with normal eye closure and smile  VIII: Hearing is bilaterally intact to finger rub  IX, X: Uvula is midline and soft palate rises symmetrically  XI: Head turning and shoulder shrug are intact.  XII: Tongue protrudes midline  Motor: Normal bulk and tone. No pronator drift. Strength bilateral upper extremity 5/5, bilateral lower extremities 5/5.  Rapid alternating movements intact and symmetric  Sensation: Intact to light touch bilaterally. No neglect or extinction on double simultaneous testing.  Coordination: No dysmetria on finger-to-nose and heel-to-shin bilaterally  Reflexes: Downgoing toes bilaterally   Gait: Narrow gait and steady    NIHSS: **** ASPECT Score: *** ICH Score: *** (GCS) Physical exam:  General: No acute distress, awake and alert  Cardiovascular: Regular rate and rhythm, no murmurs, rubs, or gallops. No bruits  Pulmonary: Anterior breath sounds clear bilaterally, no crackles or wheezing. No use of accessory muscles  GI: Abdomen soft, non-tender, non-distended    Neurologic:  -Mental status: Awake, alert, oriented to person, place, and time. Speech is fluent with intact naming, repetition, and comprehension, no dysarthria. Recent and remote memory intact. Follows commands. Attention/concentration intact. Fund of knowledge appropriate.  -Cranial nerves:   II: Visual fields are full to confrontation.  III, IV, VI: Extraocular movements are intact without nystagmus. Pupils equally round and reactive to light  V:  Facial sensation V1-V3 equal and intact   VII: Face is symmetric with normal eye closure and smile  Motor: Normal bulk and tone. No pronator drift. Strength bilateral upper extremity 5/5, bilateral lower extremities 5/5.  Sensation: Intact to light touch bilaterally. No neglect or extinction on double simultaneous testing.  Coordination: No dysmetria on finger-to-nose and heel-to-shin bilaterally  Gait: Narrow gait and steady    NIHSS: 0

## 2023-06-28 ENCOUNTER — APPOINTMENT (OUTPATIENT)
Dept: OTOLARYNGOLOGY | Facility: CLINIC | Age: 68
End: 2023-06-28
Payer: SELF-PAY

## 2023-06-28 DIAGNOSIS — H90.3 SENSORINEURAL HEARING LOSS, BILATERAL: ICD-10-CM

## 2023-06-28 DIAGNOSIS — H93.13 TINNITUS, BILATERAL: ICD-10-CM

## 2023-06-28 DIAGNOSIS — J06.9 ACUTE UPPER RESPIRATORY INFECTION, UNSPECIFIED: ICD-10-CM

## 2023-06-28 DIAGNOSIS — Z78.9 OTHER SPECIFIED HEALTH STATUS: ICD-10-CM

## 2023-06-28 DIAGNOSIS — H91.20 SUDDEN IDIOPATHIC HEARING LOSS, UNSPECIFIED EAR: ICD-10-CM

## 2023-06-28 DIAGNOSIS — H69.82 OTHER SPECIFIED DISORDERS OF EUSTACHIAN TUBE, LEFT EAR: ICD-10-CM

## 2023-06-28 DIAGNOSIS — Z86.73 PERSONAL HISTORY OF TRANSIENT ISCHEMIC ATTACK (TIA), AND CEREBRAL INFARCTION W/OUT RESIDUAL DEFICITS: ICD-10-CM

## 2023-06-28 PROBLEM — G45.9 TRANSIENT CEREBRAL ISCHEMIC ATTACK, UNSPECIFIED: Chronic | Status: ACTIVE | Noted: 2023-06-26

## 2023-06-28 PROBLEM — I10 ESSENTIAL (PRIMARY) HYPERTENSION: Chronic | Status: ACTIVE | Noted: 2023-06-26

## 2023-06-28 PROBLEM — E78.00 PURE HYPERCHOLESTEROLEMIA, UNSPECIFIED: Chronic | Status: ACTIVE | Noted: 2023-06-26

## 2023-06-28 PROBLEM — Z00.00 ENCOUNTER FOR PREVENTIVE HEALTH EXAMINATION: Status: ACTIVE | Noted: 2023-06-28

## 2023-06-28 PROCEDURE — 99214 OFFICE O/P EST MOD 30 MIN: CPT

## 2023-06-28 PROCEDURE — 69801 INCISE INNER EAR: CPT | Mod: LT

## 2023-06-28 RX ORDER — PREDNISONE 20 MG/1
20 TABLET ORAL DAILY
Refills: 0 | Status: ACTIVE | COMMUNITY

## 2023-06-28 RX ORDER — CLOPIDOGREL 75 MG/1
75 TABLET, FILM COATED ORAL
Refills: 0 | Status: ACTIVE | COMMUNITY

## 2023-06-28 NOTE — HISTORY OF PRESENT ILLNESS
[de-identified] : Mr. TOURE is a 68 year old man who was seen in f/up for left sudden HL.\par He was accompanied by his wife, who contributed to history. \par \par On 6/26, he presented to [facility] with complaints of dizziness, left tinnitus and left muffled hearing that started that day, about 2 hours after a flight from [place].  He began to feel as though everyone were speaking under water, and his baseline global tinnitus became louder.  No issues with ears on the plane or during descent.  He had a URI last week, w/ runny nose and cough. In the ED, he received one dose of aspirin and meclizine.  \par CT head at [facility] and subsequent CT brain angio were negative for hemorrhage or transcortical infarction. \par On 6/27, he was prescribed prednisone 60 mg daily for sudden SNHL; started taking the med this morning.\par History of TIA (blurry vision; resolved w/in 20 minutes), HTN, and HLD; on Plavix. \par He reports a 20 year history of significant RIGHT hearing loss and tinnitus, which started after a viral flu infection . He gradually developed HL and tinnitus on left side; now uses a hearing aid on the left in certain social situations or meetings. \par Now he denies dizziness, vertigo, vision changes, headache & postural instability.\par He denies history of exposure to ototoxic drugs, trauma, history of autoimmune disease. \par He and his wife plan to travel to [facility] on June 30, then to [place], and back to [place] near end of July.\par \par \par

## 2023-06-28 NOTE — PROCEDURE
[Risk and Benefits Discussed] : The purpose, risks, discomforts, benefits and alternatives of the procedure have been explained to the patient including no treatment. [Same] : same as the Pre Op Dx. [] : Intratympanic Therapy [FreeTextEntry1] : Sudden SNHL on left starting on 6/26/23.  Profound SNHL on right x 20 years, after viral infection. [FreeTextEntry2] : LEFT sudden sensorineural hearing loss  [FreeTextEntry4] : Topical Phenol [FreeTextEntry6] : After informed consent was obtained, the LEFT ear was examined with binocular microscopy.  Topical phenol was applied to the posterior TM, and the excess fluid was suctioned off.  A 25 gauge needle was used to make a small hole in the posterior-superior quadrant.  Then 0.4 ml of dexamethasone (100 mg/ml) was injected with 25-gauge spinal needle in different spot on leftt posterior TM.\par He stayed in position with head turned to the right x 10 minutes after injection.\par He tolerated the procedure well. \par

## 2023-06-28 NOTE — PHYSICAL EXAM
[FreeTextEntry1] : No hoarseness.  [Midline] : trachea located in midline position [Normal] : no rashes [de-identified] : Wears glasses [de-identified] : Carotid pulses 2+ bilateral.  [de-identified] : Normal except for decreased hearing.

## 2023-06-28 NOTE — ASSESSMENT
[FreeTextEntry1] : Mr. TOURE is a 68 year old man who experienced sudden SNHL on left side on 6/26/23, with sx of dizziness, left muffled hearing and increased tinnitus starting 2 hours after plane landed from [place].  He had URI last week.  Preceding history of right SNHL related to viral infection 20 years ago.  Left ear is his hearing ear.\par Audiogram showed profound SNHL on right, with word recognition not tested due to limits of audiometer; left has moderate to moderately severe SNHL with 76% word recognition.\par I have no prior audiogram for comparison, but hearing is subjectively much worse from his baseline.\par He had mild negative pressure in left ear, likely related to prior URI.\par \par Plan:\par - continue high dose prednisone\par - I advised transtympanic steroid injection to left ear to maximize recovering of hearing.  Injection was performed today.\par - He should have repeat audiogram next week but plans to be in [place] by then.  He is not sure if he jose maria delay his trip, want f/u in [place] or f/u with his ENT when gets back to [place]. \par No prohibition on flying again.\par

## 2023-06-28 NOTE — DATA REVIEWED
[de-identified] : \par AUDIOGRAM (06/28/2023)\par RIGHT: Profound SNHL \par LEFT: Moderately severe rising to moderate then sloping to severe SNHL \par Tympanogram:  Type A  on right, Type C on left\par Word recognition: 76% on left; could not test on right. \par

## 2023-07-01 DIAGNOSIS — Z79.02 LONG TERM (CURRENT) USE OF ANTITHROMBOTICS/ANTIPLATELETS: ICD-10-CM

## 2023-07-01 DIAGNOSIS — I63.9 CEREBRAL INFARCTION, UNSPECIFIED: ICD-10-CM

## 2023-07-01 DIAGNOSIS — H91.91 UNSPECIFIED HEARING LOSS, RIGHT EAR: ICD-10-CM

## 2023-07-01 DIAGNOSIS — Z86.73 PERSONAL HISTORY OF TRANSIENT ISCHEMIC ATTACK (TIA), AND CEREBRAL INFARCTION WITHOUT RESIDUAL DEFICITS: ICD-10-CM

## 2023-07-01 DIAGNOSIS — H90.A22 SENSORINEURAL HEARING LOSS, UNILATERAL, LEFT EAR, WITH RESTRICTED HEARING ON THE CONTRALATERAL SIDE: ICD-10-CM

## 2023-07-01 DIAGNOSIS — I10 ESSENTIAL (PRIMARY) HYPERTENSION: ICD-10-CM

## 2023-07-01 DIAGNOSIS — E78.5 HYPERLIPIDEMIA, UNSPECIFIED: ICD-10-CM

## 2023-07-01 DIAGNOSIS — Z53.29 PROCEDURE AND TREATMENT NOT CARRIED OUT BECAUSE OF PATIENT'S DECISION FOR OTHER REASONS: ICD-10-CM

## 2023-09-05 NOTE — ED ADULT NURSE NOTE - CAS EDN DISCHARGE INTERVENTIONS
The patient is Stable - Low risk of patient condition declining or worsening    Shift Goals  Clinical Goals: Abx, O2 protocol, monitor labs, PT/OT.  Patient Goals: Comfort    Progress made toward(s) clinical / shift goals:    Problem: Hemodynamics  Goal: Patient's hemodynamics, fluid balance and neurologic status will be stable or improve  Outcome: Progressing  Note: WBC decreasing     Problem: Physical Regulation  Goal: Signs and symptoms of infection will decrease  Outcome: Progressing       Patient is not progressing towards the following goals:      Problem: Respiratory  Goal: Patient will achieve/maintain optimum respiratory ventilation and gas exchange  Outcome: Not Progressing  Note: Currently requiring 4L at rest and 5-6 during ambulation.      IV intact